# Patient Record
Sex: FEMALE | Race: WHITE | NOT HISPANIC OR LATINO | Employment: OTHER | URBAN - METROPOLITAN AREA
[De-identification: names, ages, dates, MRNs, and addresses within clinical notes are randomized per-mention and may not be internally consistent; named-entity substitution may affect disease eponyms.]

---

## 2017-02-27 ENCOUNTER — TRANSCRIBE ORDERS (OUTPATIENT)
Dept: OBSTETRICS AND GYNECOLOGY | Facility: CLINIC | Age: 67
End: 2017-02-27

## 2017-02-27 DIAGNOSIS — Z12.31 VISIT FOR SCREENING MAMMOGRAM: Primary | ICD-10-CM

## 2017-03-23 ENCOUNTER — OFFICE VISIT (OUTPATIENT)
Dept: OBSTETRICS AND GYNECOLOGY | Facility: CLINIC | Age: 67
End: 2017-03-23

## 2017-03-23 ENCOUNTER — HOSPITAL ENCOUNTER (OUTPATIENT)
Dept: MAMMOGRAPHY | Facility: HOSPITAL | Age: 67
Discharge: HOME | End: 2017-03-23
Attending: OBSTETRICS & GYNECOLOGY | Admitting: OBSTETRICS & GYNECOLOGY

## 2017-03-23 VITALS
BODY MASS INDEX: 45.99 KG/M2 | HEIGHT: 67 IN | WEIGHT: 293 LBS | SYSTOLIC BLOOD PRESSURE: 116 MMHG | DIASTOLIC BLOOD PRESSURE: 76 MMHG

## 2017-03-23 DIAGNOSIS — E66.01 MORBID OBESITY DUE TO EXCESS CALORIES (HCC): ICD-10-CM

## 2017-03-23 DIAGNOSIS — Z12.31 VISIT FOR SCREENING MAMMOGRAM: ICD-10-CM

## 2017-03-23 DIAGNOSIS — Z78.0 MENOPAUSE: Primary | ICD-10-CM

## 2017-03-23 DIAGNOSIS — B37.31 VAGINAL CANDIDIASIS: ICD-10-CM

## 2017-03-23 PROCEDURE — G0202 SCR MAMMO BI INCL CAD: HCPCS

## 2017-03-23 PROCEDURE — 99214 OFFICE O/P EST MOD 30 MIN: CPT | Performed by: OBSTETRICS & GYNECOLOGY

## 2017-03-23 PROCEDURE — 77063 BREAST TOMOSYNTHESIS BI: CPT

## 2017-03-23 PROCEDURE — 83986 ASSAY PH BODY FLUID NOS: CPT | Performed by: OBSTETRICS & GYNECOLOGY

## 2017-03-23 PROCEDURE — G0202 SCR MAMMO BI INCL CAD: HCPCS | Performed by: RADIOLOGY

## 2017-03-23 PROCEDURE — 77063 BREAST TOMOSYNTHESIS BI: CPT | Performed by: RADIOLOGY

## 2017-03-23 PROCEDURE — 87210 SMEAR WET MOUNT SALINE/INK: CPT | Performed by: OBSTETRICS & GYNECOLOGY

## 2017-03-23 RX ORDER — LABETALOL 100 MG/1
1 TABLET, FILM COATED ORAL DAILY
Refills: 0 | COMMUNITY
Start: 2017-02-24

## 2017-03-23 RX ORDER — PRASUGREL HCL 10 MG
1 TABLET ORAL DAILY
Refills: 1 | COMMUNITY
Start: 2017-02-11

## 2017-03-23 RX ORDER — ROSUVASTATIN CALCIUM 10 MG/1
1 TABLET, COATED ORAL DAILY
Refills: 1 | COMMUNITY
Start: 2017-02-16

## 2017-03-23 RX ORDER — LOSARTAN POTASSIUM 100 MG/1
1 TABLET ORAL DAILY
Refills: 0 | COMMUNITY
Start: 2017-02-11

## 2017-03-23 RX ORDER — FLUCONAZOLE 150 MG/1
150 TABLET ORAL EVERY OTHER DAY
Qty: 3 TABLET | Refills: 1 | Status: SHIPPED | OUTPATIENT
Start: 2017-03-23 | End: 2017-03-28

## 2017-03-23 NOTE — PROGRESS NOTES
"   Chief Complaint   Patient presents with   • Gynecologic Exam   • Vaginitis     c/o discharge--??yeast infection       Nahomy Mirza is a 66 y.o. year old  presenting to be seen in follow-up of morbid obesity, diabetes mellitus, and a prior abdominal hysterectomy/bilateral salpingo-oophorectomy/appendectomy.  She has complaints of increased vaginal discharge and some itching.  Her hemoglobin A1c has not been under good control.  She has not been on antibiotic therapy recently.  She denies chronic vaginal itching.    History   Sexual Activity   • Sexual activity: No     She would not like to be screened for STD's at today's exam.       SCREENING TESTS    Year 2012 2016 2017   Age                         PAP   Neg.                      HPV high risk                         Mammogram     benign                    LYNDSEY score                         Breast MRI                         Lipids                         Vitamin D                         Colonoscopy                         DEXA  Frax (hip/any)   normal                      Ovarian Screen                           Enter the month test was performed.  If month not known, enter \"X'  · Black numbers = normal results  · Red numbers = abnormal results  · Black X = patient reported normal  · Red X - patient reported abnormal      Referred by:    Profession:    Other info:        No Additional Complaints Reported    The following portions of the patient's history were reviewed and updated as appropriate:vital signs and   She  does not have any pertinent problems on file.  She  has a past surgical history that includes Mouth surgery; Total abdominal hysterectomy w/ bilateral salpingoophorectomy; Coronary angioplasty with stent; Mouth surgery; and Adenoidectomy.  Her family history is not on file.  She  reports that she has never smoked. She does not have any " "smokeless tobacco history on file. She reports that she drinks alcohol. She reports that she does not use illicit drugs.  Current Outpatient Prescriptions   Medication Sig Dispense Refill   • aspirin 81 MG EC tablet Take 81 mg by mouth Daily.     • calcium carbonate (OS-MINAL) 600 MG tablet Take 600 mg by mouth Daily.     • cholecalciferol (VITAMIN D3) 400 UNITS tablet Take 400 Units by mouth Daily.     • EFFIENT 10 MG tablet Take 1 tablet by mouth Daily.  1   • ezetimibe (ZETIA) 10 MG tablet Take 10 mg by mouth Daily.     • fenofibrate (TRICOR) 145 MG tablet Take 145 mg by mouth Daily.     • fluconazole (DIFLUCAN) 150 MG tablet Take 1 tablet by mouth Every Other Day for 3 doses. 1 Every other day 3 tablet 1   • Insulin Lispro Prot & Lispro (HUMALOG MIX 75/25 SC) Inject  under the skin.     • labetalol (NORMODYNE) 100 MG tablet Take 1 tablet by mouth Daily.  0   • losartan (COZAAR) 100 MG tablet Take 1 tablet by mouth Daily.  0   • rosuvastatin (CRESTOR) 10 MG tablet Take 1 tablet by mouth Daily.  1   • vitamin B-12 (CYANOCOBALAMIN) 100 MCG tablet Take 50 mcg by mouth Daily.       No current facility-administered medications for this visit.      She is allergic to naproxen; penicillins; and sulfa antibiotics..        Review of Systems  A comprehensive review of systems was negative.  Constitutional: negative for fever, chills, activity change, appetite change, fatigue and unexpected weight change.  Respiratory: negative  Cardiovascular: negative  Gastrointestinal: negative  Genitourinary:negative  Musculoskeletal:negative  Behavioral/Psych: negative          /76  Ht 67\" (170.2 cm)  Wt (!) 329 lb 12.8 oz (150 kg)  LMP  (LMP Unknown)  BMI 51.65 kg/m2    Physical Exam    General:  alert; cooperative; well developed; well nourished  obese - Body mass index is 51.65 kg/(m^2).   Skin:  No suspicious lesions seen   Thyroid: normal to inspection and palpation   Lungs:  clear to auscultation bilaterally   Heart:  " regular rate and rhythm, S1, S2 normal, no murmur, click, rub or gallop   Breasts:  Examined in supine position  Symmetric without masses or skin dimpling  Nipples normal without inversion, lesions or discharge  There are no palpable axillary nodes   Abdomen: soft, non-tender; no masses  no umbilical or inginual hernias are present  no hepato-splenomegaly  obese. Tender at insulin injection sites.   Pelvis: Clinical staff was present for exam  External genitalia:  normal appearance of the external genitalia including Bartholin's and Amber's glands.  Vaginal:  discharge present -  white and thick; pH = 4.0 wet prep done: pseudo-hyphae are present;  Cervix:  absent.  Uterus:  absent.  Adnexa:  absent, bilateral.  Rectal:  anus visually normal appearing. recto-vaginal exam unremarkable and confirms findings;     Lab Review   No data reviewed    Imaging   Mammogram results- benign  Counseling was given to patient for the following topics: diagnostic results, instructions for management, risk factor reductions, impressions, risks and benefits of treatment options and importance of treatment compliance . Total time of the encounter was > 25 minute(s) and > 15 minute(s)  was spent counseling the patient about the importance of diabetic control.  I have advised her that if she does take antibiotics she should concomitantly be treated for fungal infection.  She was advised to take fluconazole, 150 mg every other day for 3 doses.  She was once again advised in a low carbohydrate ADA diet.          ASSESSMENT  Problems Addressed this Visit        Digestive    Morbid obesity       Genitourinary    Menopause - Primary      Other Visit Diagnoses     Vaginal candidiasis        Relevant Medications    fluconazole (DIFLUCAN) 150 MG tablet            PLAN    Medications prescribed this encounter:    New Medications Ordered This Visit   Medications   • losartan (COZAAR) 100 MG tablet     Sig: Take 1 tablet by mouth Daily.     Refill:   0   • EFFIENT 10 MG tablet     Sig: Take 1 tablet by mouth Daily.     Refill:  1   • rosuvastatin (CRESTOR) 10 MG tablet     Sig: Take 1 tablet by mouth Daily.     Refill:  1   • labetalol (NORMODYNE) 100 MG tablet     Sig: Take 1 tablet by mouth Daily.     Refill:  0   • fluconazole (DIFLUCAN) 150 MG tablet     Sig: Take 1 tablet by mouth Every Other Day for 3 doses. 1 Every other day     Dispense:  3 tablet     Refill:  1     QOD   · ADA diet  · Follow up: 12 month(s)  · Calcium, 600 mg/ Vit. D, 400 IU daily     *Please note that portions of this documentation may have been completed with a voice recognition program.  Efforts were made to edit this dictation, but occasional words may have been mistranscribed.     This note was electronically signed.    LINDA Foster MD  March 23, 2017  11:48 AM

## 2018-03-19 ENCOUNTER — TRANSCRIBE ORDERS (OUTPATIENT)
Dept: OBSTETRICS AND GYNECOLOGY | Facility: CLINIC | Age: 68
End: 2018-03-19

## 2018-03-19 DIAGNOSIS — Z12.31 VISIT FOR SCREENING MAMMOGRAM: Primary | ICD-10-CM
